# Patient Record
Sex: MALE | Race: WHITE | NOT HISPANIC OR LATINO | Employment: FULL TIME | URBAN - NONMETROPOLITAN AREA
[De-identification: names, ages, dates, MRNs, and addresses within clinical notes are randomized per-mention and may not be internally consistent; named-entity substitution may affect disease eponyms.]

---

## 2021-12-16 ENCOUNTER — OFFICE VISIT (OUTPATIENT)
Dept: URGENT CARE | Facility: PHYSICIAN GROUP | Age: 46
End: 2021-12-16

## 2021-12-16 ENCOUNTER — HOSPITAL ENCOUNTER (OUTPATIENT)
Facility: MEDICAL CENTER | Age: 46
End: 2021-12-16
Attending: PHYSICIAN ASSISTANT

## 2021-12-16 VITALS
HEIGHT: 67 IN | HEART RATE: 108 BPM | OXYGEN SATURATION: 97 % | DIASTOLIC BLOOD PRESSURE: 80 MMHG | BODY MASS INDEX: 31.08 KG/M2 | SYSTOLIC BLOOD PRESSURE: 112 MMHG | TEMPERATURE: 97.2 F | RESPIRATION RATE: 16 BRPM | WEIGHT: 198 LBS

## 2021-12-16 DIAGNOSIS — L03.90 WOUND CELLULITIS: ICD-10-CM

## 2021-12-16 PROCEDURE — 87186 SC STD MICRODIL/AGAR DIL: CPT

## 2021-12-16 PROCEDURE — 87077 CULTURE AEROBIC IDENTIFY: CPT

## 2021-12-16 PROCEDURE — 87070 CULTURE OTHR SPECIMN AEROBIC: CPT

## 2021-12-16 PROCEDURE — 99203 OFFICE O/P NEW LOW 30 MIN: CPT | Performed by: PHYSICIAN ASSISTANT

## 2021-12-16 PROCEDURE — 87205 SMEAR GRAM STAIN: CPT

## 2021-12-16 RX ORDER — IBUPROFEN 800 MG/1
800 TABLET ORAL EVERY 8 HOURS PRN
Qty: 30 TABLET | Refills: 0 | Status: SHIPPED | OUTPATIENT
Start: 2021-12-16

## 2021-12-16 RX ORDER — SULFAMETHOXAZOLE AND TRIMETHOPRIM 800; 160 MG/1; MG/1
1 TABLET ORAL 2 TIMES DAILY
Qty: 14 TABLET | Refills: 0 | Status: SHIPPED | OUTPATIENT
Start: 2021-12-16 | End: 2021-12-23

## 2021-12-16 ASSESSMENT — PAIN SCALES - GENERAL: PAINLEVEL: 10=SEVERE PAIN

## 2021-12-16 NOTE — PROGRESS NOTES
"Subjective:   Derick Castrejon is a 46 y.o. male who presents for Spider Bite (right buttocks)      HPI  Patient is a 46-year-old male who presents to clinic with complaints of an infection to his right buttocks possibly from a spider bite.  No known injury or trauma.  He did not see the spider.  Onset of symptoms 5 days ago.  Gradually worsening redness, swelling, pain.  Intermittent drainage today.  The wound is open.  No fevers or chills.  History of MRSA infection and sepsis.      Medications:    • albuterol Aers  • azithromycin Tabs  • guaifenesin-codeine Soln  • ibuprofen Tabs  • promethazine-codeine Syrp    Allergies: Patient has no known allergies.    Problem List: Derick Castrejon does not have a problem list on file.    Surgical History:  No past surgical history on file.    Past Social Hx: Derick Castrejon  reports that he has been smoking. He has been smoking about 0.50 packs per day. He has never used smokeless tobacco. He reports previous alcohol use. He reports that he does not use drugs.     Past Family Hx:  Derick Castrejon family history is not on file.     Problem list, medications, and allergies reviewed by myself today in Epic.     Objective:     /80   Pulse (!) 108   Temp 36.2 °C (97.2 °F) (Temporal)   Resp 16   Ht 1.702 m (5' 7\")   Wt 89.8 kg (198 lb)   SpO2 97%   BMI 31.01 kg/m²     Physical Exam  Vitals reviewed.   Constitutional:       General: He is not in acute distress.     Appearance: Normal appearance. He is not ill-appearing or toxic-appearing.   Eyes:      Conjunctiva/sclera: Conjunctivae normal.      Pupils: Pupils are equal, round, and reactive to light.   Cardiovascular:      Rate and Rhythm: Normal rate.   Pulmonary:      Effort: Pulmonary effort is normal.   Musculoskeletal:      Cervical back: Neck supple.   Skin:     General: Skin is warm.             Comments: Right gluteus there is an ulcerative open wound approximately 1 x 2 cm with significant surrounding erythema and " induration extending about 9 cm in diameter.  No palpable fluctuance or abscess.  No deep palpable abscess.  Tender to palpation.  No active drainage but there is some exudate to the central wound.  No streaking.   Neurological:      General: No focal deficit present.      Mental Status: He is alert and oriented to person, place, and time.   Psychiatric:         Mood and Affect: Mood normal.         Behavior: Behavior normal.         Diagnosis and associated orders:     1. Wound cellulitis  ibuprofen (MOTRIN) 800 MG Tab    sulfamethoxazole-trimethoprim (BACTRIM DS) 800-160 MG tablet    CULTURE WOUND W/ GRAM STAIN        Comments/MDM:     • The patient's presenting symptoms and exam findings are consistent with a wound infection.  • Wound cultured   • Start Bactrim.   • Wound cleansed with irrigation with normal saline and dressed with nonstick dressing.  Wound care discussed.  • Ibuprofen as prescribed for pain.  • If no improvement in the next 2 to 3 days or any worsening redness, swelling, drainage, fevers or any other concerns recommend returning to the urgent care or presenting to the ER for reevaluation.       I personally reviewed prior external notes and test results pertinent to today's visit. Supportive care, natural history, differential diagnoses, and indications for immediate follow-up discussed. Patient expresses understanding and agrees to plan. Patient denies any other questions or concerns.     Follow-up with the primary care physician for recheck, reevaluation, and consideration of further management.    Please note that this dictation was created using voice recognition software. I have made a reasonable attempt to correct obvious errors, but I expect that there are errors of grammar and possibly content that I did not discover before finalizing the note.    This note was electronically signed by Norman Baez PA-C

## 2021-12-17 DIAGNOSIS — L03.90 WOUND CELLULITIS: ICD-10-CM

## 2021-12-17 LAB
GRAM STN SPEC: NORMAL
SIGNIFICANT IND 70042: NORMAL
SITE SITE: NORMAL
SOURCE SOURCE: NORMAL

## 2021-12-19 LAB
BACTERIA WND AEROBE CULT: ABNORMAL
BACTERIA WND AEROBE CULT: ABNORMAL
GRAM STN SPEC: ABNORMAL
SIGNIFICANT IND 70042: ABNORMAL
SITE SITE: ABNORMAL
SOURCE SOURCE: ABNORMAL